# Patient Record
Sex: MALE
[De-identification: names, ages, dates, MRNs, and addresses within clinical notes are randomized per-mention and may not be internally consistent; named-entity substitution may affect disease eponyms.]

---

## 2021-01-24 ENCOUNTER — HOSPITAL ENCOUNTER (EMERGENCY)
Dept: HOSPITAL 5 - ED | Age: 61
Discharge: HOME | End: 2021-01-24
Payer: SELF-PAY

## 2021-01-24 VITALS — SYSTOLIC BLOOD PRESSURE: 168 MMHG | DIASTOLIC BLOOD PRESSURE: 107 MMHG

## 2021-01-24 DIAGNOSIS — Z79.899: ICD-10-CM
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
## 2021-01-24 DIAGNOSIS — F17.200: ICD-10-CM
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
## 2021-01-24 DIAGNOSIS — S61.211A: Primary | ICD-10-CM
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
## 2021-01-24 PROCEDURE — 99283 EMERGENCY DEPT VISIT LOW MDM: CPT

## 2021-01-24 PROCEDURE — 90715 TDAP VACCINE 7 YRS/> IM: CPT

## 2021-01-24 PROCEDURE — 90471 IMMUNIZATION ADMIN: CPT

## 2021-01-24 NOTE — EMERGENCY DEPARTMENT REPORT
ED Laceration HPI





- HPI


Chief Complaint: Wound/Laceration


Stated Complaint: LT HAND INJURY


Time Seen by Provider: 01/24/21 14:53


Occurred When: Today


Location: Upper Extremity (Left hand)


Tetanus Status: Not up to Date


Laceration Symptoms: Yes Pain, No Foreign Body Sensation, No Numbness, No 

Weakness


Other History: 60-year-old  male presents to the emergency room for 

left index finger laceration that happened this afternoon.  Patient states that 

he was using a saw to cut wood for furniture and cut himself.  Patient reports 

he has not had a tetanus shot in probably 30 years.  Patient complains of pain. 

Patient has no other injuries.  Patient reports no past medical history as he 

really does not follow-up with a primary care provider.





ED Review of Systems


ROS: 


Stated complaint: LT HAND INJURY


Other details as noted in HPI





Comment: All other systems reviewed and negative





ED Past Medical Hx





- Past Medical History


Previous Medical History?: No





- Surgical History


Past Surgical History?: No





- Social History


Smoking Status: Current Every Day Smoker


Substance Use Type: None





- Medications


Home Medications: 


                                Home Medications











 Medication  Instructions  Recorded  Confirmed  Last Taken  Type


 


Acetaminophen/Codeine [Tylenol 1 tab PO Q6H PRN #12 tab 01/24/21  Unknown Rx





/Codeine # 3 tab]     


 


Clindamycin [Clindamycin CAP] 300 mg PO Q8H 10 Days #30 cap 01/24/21  Unknown Rx


 


amLODIPine 5 mg PO DAILY #30 tab 01/24/21  Unknown Rx














Laceration Physical Exam





- Exam


General: 


Vital signs noted. No distress. Alert and acting appropriately.








ED Course


                                   Vital Signs











  01/24/21





  13:49


 


Temperature 97.7 F


 


Pulse Rate 87


 


Respiratory 18





Rate 


 


Blood Pressure 179/122


 


O2 Sat by Pulse 95





Oximetry 














- Laceration /Wound Repair


  ** Left Finger


Wound Location: upper extremity (Left lower medial index finger)


Wound Length (cm): 4


Wound Explored: no foreign body removed


Irrigated w/ Saline (ccs): 60


Betadine Prep?: Yes


Anesthesia: 1% Lidocaine


Volume Anesthetic (ccs): 10


Wound Debrided: minimal


Wound Repaired With: sutures


Suture Size/Type: 3:0


Number of Sutures: 7


Layer Closure?: No


Sterile Dressing Applied?: Yes


Progress: 





Patient tolerated well





ED Medical Decision Making





- Medical Decision Making





60-year-old  male presents to the emergency room for left index finger 

laceration that happened this afternoon.  Patient states that he was using a saw

to cut wood for furniture and cut himself.  Patient reports he has not had a 

tetanus shot in probably 30 years.  Patient complains of pain.  Patient has no 

other injuries.  Patient reports no past medical history as he really does not 

follow-up with a primary care provider.





Laceration repair 2 left index finger.  Instructed patient to complete 

antibiotics.  Instructed patient to start hypertension medicine and encouraged 

him to follow-up with a community provider.  [facility] is to return back to the emergency room to have 

sutures removed in 10 to 14 days.


Critical care attestation.: 


If time is entered above; I have spent that time in minutes in the direct care 

of this critically ill patient, excluding procedure time.








ED Disposition


Clinical Impression: 


 Laceration of left hand, HTN (hypertension)





Disposition: DC-01 TO HOME OR SELFCARE


Is pt being admited?: No


Does the pt Need Aspirin: No


Condition: Stable


Instructions:  Hypertension, Adult, Easy-to-Read, Sutured Wound Care, 

Easy-to-Read, Hypertension (ED)


Additional Instructions: 


Complete antibiotics as prescribed.  Pain medication as needed.  Do not operate 

heavy machinery while taking pain medication.  You can also incorporate 

ibuprofen for your pain management.  You must return back in 10 to 14 days to 

have sutures removed.


Prescriptions: 


amLODIPine 5 mg PO DAILY #30 tab


Clindamycin [Clindamycin CAP] 300 mg PO Q8H 10 Days #30 cap


Acetaminophen/Codeine [Tylenol /Codeine # 3 tab] 1 tab PO Q6H PRN #12 tab


 PRN Reason: Pain , Severe (7-10)


Referrals: 


PRIMARY CARE,MD [Primary Care Provider] - 3-5 Days


[facility] [Provider Group] - 3-5 Days

## 2021-01-24 NOTE — XRAY REPORT
LEFT HAND 3 VIEWS



INDICATION / CLINICAL INFORMATION:

lac base of index finger;skill saw.



COMPARISON:

None available.



FINDINGS:

Metallic foreign bodies are demonstrated in the dorsum of the hand at the level of the third and four
th metacarpal phalangeal joints.



No appreciable fracture or other skeletal abnormality.



Signer Name: Marco Andres MD 

Signed: 1/24/2021 2:29 PM

Workstation Name: [id number]

## 2024-06-12 ENCOUNTER — TELEPHONE ENCOUNTER (OUTPATIENT)
Dept: URBAN - METROPOLITAN AREA CLINIC 92 | Facility: CLINIC | Age: 64
End: 2024-06-12

## 2024-06-14 ENCOUNTER — OFFICE VISIT (OUTPATIENT)
Dept: URBAN - METROPOLITAN AREA CLINIC 92 | Facility: CLINIC | Age: 64
End: 2024-06-14

## 2024-08-02 PROBLEM — 453861000124107: Status: ACTIVE | Noted: 2024-08-02

## 2024-08-02 PROBLEM — 443913008: Status: ACTIVE | Noted: 2024-08-02

## 2024-08-05 ENCOUNTER — OFFICE VISIT (OUTPATIENT)
Dept: URBAN - METROPOLITAN AREA CLINIC 86 | Facility: CLINIC | Age: 64
End: 2024-08-05

## 2024-08-05 NOTE — HPI-TODAY'S VISIT:
This is a 63-year-old male, referred by Rebekah Osborn nurse practitioner for evaluation of hepatitis C positive antibody test. The patient is listed as taking aspirin 81 mg, atorvastatin 80 mg, gabapentin 400 mg, losartan hydrochlorothiazide 5012 0.5, metoprolol 25 mg, omeprazole 40 mg, and tramadol 50 as needed.   Labs on April 24 showed total cholesterol 113.  HIV test is negative.  RPR negative.  Hepatitis C antibody was reactive.  White blood cells 8.7, MCV 82 and platelets listed as 214.  Glucose 103, creatinine 0.89, sodium 144, potassium 4.4, bilirubin 0.4, alkaline phosphatase 94, AST 26 and ALT 30.  Hemoglobin A1c 6.4.

## 2024-08-09 ENCOUNTER — OFFICE VISIT (OUTPATIENT)
Dept: URBAN - METROPOLITAN AREA CLINIC 86 | Facility: CLINIC | Age: 64
End: 2024-08-09
Payer: COMMERCIAL

## 2024-08-09 ENCOUNTER — DASHBOARD ENCOUNTERS (OUTPATIENT)
Age: 64
End: 2024-08-09

## 2024-08-09 ENCOUNTER — LAB OUTSIDE AN ENCOUNTER (OUTPATIENT)
Dept: URBAN - METROPOLITAN AREA CLINIC 86 | Facility: CLINIC | Age: 64
End: 2024-08-09

## 2024-08-09 VITALS
HEART RATE: 77 BPM | SYSTOLIC BLOOD PRESSURE: 138 MMHG | HEIGHT: 67 IN | BODY MASS INDEX: 28.41 KG/M2 | TEMPERATURE: 98.1 F | WEIGHT: 181 LBS | DIASTOLIC BLOOD PRESSURE: 88 MMHG

## 2024-08-09 DIAGNOSIS — R76.8 HCV ANTIBODY POSITIVE: ICD-10-CM

## 2024-08-09 DIAGNOSIS — Z79.899 HIGH RISK MEDICATION USE: ICD-10-CM

## 2024-08-09 PROBLEM — 314706002: Status: ACTIVE | Noted: 2024-08-09

## 2024-08-09 PROCEDURE — 99204 OFFICE O/P NEW MOD 45 MIN: CPT | Performed by: PHYSICIAN ASSISTANT

## 2024-08-09 RX ORDER — DICLOFENAC POTASSIUM 50 MG/1
1 TABLET WITH FOOD OR MILK AS NEEDED TABLET, FILM COATED ORAL TWICE A DAY
Status: ACTIVE | COMMUNITY

## 2024-08-09 RX ORDER — ATORVASTATIN CALCIUM 40 MG/1
1 TABLET TABLET, FILM COATED ORAL ONCE A DAY
Status: ACTIVE | COMMUNITY

## 2024-08-09 RX ORDER — TRAMADOL HYDROCHLORIDE 50 MG/1
1 TABLET AS NEEDED TABLET, FILM COATED ORAL ONCE A DAY
Status: ACTIVE | COMMUNITY

## 2024-08-09 RX ORDER — LOSARTAN POTASSIUM 50 MG/1
1 TABLET TABLET, FILM COATED ORAL ONCE A DAY
Status: ACTIVE | COMMUNITY

## 2024-08-09 RX ORDER — ALBUTEROL SULFATE 90 UG/1
1 PUFF AS NEEDED AEROSOL, METERED RESPIRATORY (INHALATION)
Status: ACTIVE | COMMUNITY

## 2024-08-09 NOTE — HPI-TODAY'S VISIT:
This is a 63-year-old male, referred by Rebekah Osborn nurse practitioner for evaluation of hepatitis C positive antibody test.  The patient is listed as taking aspirin 81 mg, atorvastatin 80 mg, gabapentin 400 mg, losartan hydrochlorothiazide  50-12.5, metoprolol 25 mg, omeprazole 40 mg, and tramadol 50 as needed.    Labs on April 24 showed total cholesterol 113.  HIV test is negative.  RPR negative.  Hepatitis C antibody was reactive.  White blood cells 8.7, MCV 82 and platelets listed as 214.  Glucose 103, creatinine 0.89, sodium 144, potassium 4.4, bilirubin 0.4, alkaline phosphatase 94, AST 26 and ALT 30.  Hemoglobin A1c 6.4.  He notes he was told his about 30 years ago.   Discussed the virus and transmssion and hx of iv drug use Disucssed the us and why needed and discussed med options.  He is on atorvstatin and needs to be on pravastatin Will update labs and check the genotype and check for the hep b core status.

## 2024-08-22 ENCOUNTER — OFFICE VISIT (OUTPATIENT)
Dept: URBAN - METROPOLITAN AREA CLINIC 91 | Facility: CLINIC | Age: 64
End: 2024-08-22
Payer: COMMERCIAL

## 2024-08-22 DIAGNOSIS — K76.0 FATTY (CHANGE OF) LIVER: ICD-10-CM

## 2024-08-22 DIAGNOSIS — B18.2 HEP C W/O COMA, CHRONIC: ICD-10-CM

## 2024-08-22 PROCEDURE — 76705 ECHO EXAM OF ABDOMEN: CPT

## 2024-08-22 PROCEDURE — 93975 VASCULAR STUDY: CPT

## 2024-08-25 LAB
A/G RATIO: 1.8
ABSOLUTE BASOPHILS: 30
ABSOLUTE EOSINOPHILS: 30
ABSOLUTE LYMPHOCYTES: 2873
ABSOLUTE MONOCYTES: 654
ABSOLUTE NEUTROPHILS: 4013
ALBUMIN: 4.2
ALKALINE PHOSPHATASE: 78
ALT (SGPT): 20
AST (SGOT): 20
BASOPHILS: 0.4
BILIRUBIN, TOTAL: 0.4
BUN/CREATININE RATIO: (no result)
BUN: 14
CALCIUM: 9.4
CARBON DIOXIDE, TOTAL: 31
CHLORIDE: 100
CREATININE: 0.73
EGFR: 102
EOSINOPHILS: 0.4
GLOBULIN, TOTAL: 2.4
GLUCOSE: 98
HCV RNA, QUANTITATIVE REAL TIME PCR: <1.18
HCV RNA, QUANTITATIVE REAL TIME PCR: <15
HEMATOCRIT: 42.2
HEMOGLOBIN: 13.6
HEPATITIS A AB, TOTAL: (no result)
HEPATITIS B CORE AB TOTAL: (no result)
HEPATITIS B SURFACE AB IMMUNITY, QN: <5
HEPATITIS B SURFACE ANTIGEN: (no result)
LYMPHOCYTES: 37.8
MCH: 27.9
MCHC: 32.2
MCV: 86.7
MONOCYTES: 8.6
MPV: 10.8
NEUTROPHILS: 52.8
PLATELET COUNT: 224
POTASSIUM: 4
PROTEIN, TOTAL: 6.6
RDW: 13.1
RED BLOOD CELL COUNT: 4.87
SODIUM: 141
WHITE BLOOD CELL COUNT: 7.6

## 2024-08-26 ENCOUNTER — TELEPHONE ENCOUNTER (OUTPATIENT)
Dept: URBAN - METROPOLITAN AREA CLINIC 86 | Facility: CLINIC | Age: 64
End: 2024-08-26

## 2024-08-26 NOTE — HPI-TODAY'S VISIT:
Dear Rad Kwon,  The 8/22/24 labs were sent to me. You are not immune to hepatitis b and recommend discussing this vaccine with your primary care.  The creatinine 0.73, sodium 141, potassium 4.0, bilirubin 0.4, alkaline phosphatase 78, AST 20 and ALT 20.  The white blood cell 7.6, hemoglobin 13.6, MCV 86 and platelets 224 and this is normal.  There is no evidence of hepatitis B.  You are not immune to hepatitis A and recommend discussing it with your primary care.  Hepatitis C testing was negative.  The ultrasound was also sent to me and it showed that the liver had increased echogenicity but did not see any lesions.  Common bile duct 5 mm and this was normal.  The gallbladder appeared normal.  Right kidney normal.  Hepatic vasculature patent.  Spleen 8.4 cm.  Overall they saw increased echogenicity of the liver which can be seen seen and fatty liver and chronic liver disease.  If you recall we were checking this because you had a lab with your primary care indicating that you had the antibody to hepatitis C.  This testing shows that there is no virus.  We will review this at the follow-up visit. Breana Estrella PA-C

## 2024-09-27 ENCOUNTER — OFFICE VISIT (OUTPATIENT)
Dept: URBAN - METROPOLITAN AREA CLINIC 86 | Facility: CLINIC | Age: 64
End: 2024-09-27

## 2024-10-07 ENCOUNTER — OFFICE VISIT (OUTPATIENT)
Dept: URBAN - METROPOLITAN AREA CLINIC 86 | Facility: CLINIC | Age: 64
End: 2024-10-07
Payer: MEDICARE

## 2024-10-07 VITALS
DIASTOLIC BLOOD PRESSURE: 84 MMHG | SYSTOLIC BLOOD PRESSURE: 147 MMHG | HEIGHT: 67 IN | HEART RATE: 60 BPM | BODY MASS INDEX: 29.35 KG/M2 | TEMPERATURE: 97.7 F | WEIGHT: 187 LBS

## 2024-10-07 DIAGNOSIS — Z79.899 HIGH RISK MEDICATION USE: ICD-10-CM

## 2024-10-07 DIAGNOSIS — K76.0 FATTY LIVER: ICD-10-CM

## 2024-10-07 DIAGNOSIS — Z71.85 VACCINE COUNSELING: ICD-10-CM

## 2024-10-07 DIAGNOSIS — R76.8 HCV ANTIBODY POSITIVE: ICD-10-CM

## 2024-10-07 PROBLEM — 197321007: Status: ACTIVE | Noted: 2024-10-07

## 2024-10-07 PROCEDURE — 99214 OFFICE O/P EST MOD 30 MIN: CPT | Performed by: PHYSICIAN ASSISTANT

## 2024-10-07 RX ORDER — DICLOFENAC POTASSIUM 50 MG/1
1 TABLET WITH FOOD OR MILK AS NEEDED TABLET, FILM COATED ORAL TWICE A DAY
Status: ACTIVE | COMMUNITY

## 2024-10-07 RX ORDER — TRAMADOL HYDROCHLORIDE 50 MG/1
1 TABLET AS NEEDED TABLET, FILM COATED ORAL ONCE A DAY
Status: ACTIVE | COMMUNITY

## 2024-10-07 RX ORDER — ATORVASTATIN CALCIUM 40 MG/1
1 TABLET TABLET, FILM COATED ORAL ONCE A DAY
Status: ACTIVE | COMMUNITY

## 2024-10-07 RX ORDER — ALBUTEROL SULFATE 90 UG/1
1 PUFF AS NEEDED AEROSOL, METERED RESPIRATORY (INHALATION)
Status: ACTIVE | COMMUNITY

## 2024-10-07 RX ORDER — LOSARTAN POTASSIUM 50 MG/1
1 TABLET TABLET, FILM COATED ORAL ONCE A DAY
Status: ACTIVE | COMMUNITY

## 2024-10-07 NOTE — HPI-TODAY'S VISIT:
This is a 63-year-old male, referred by Rebekah Osborn nurse practitioner for evaluation of hepatitis C positive antibody test.  10/7/24 Dear Rad Kwon,  The 8/22/24 labs were sent to me. You are not immune to hepatitis b and recommend discussing this vaccine with your primary care. The creatinine 0.73, sodium 141, potassium 4.0, bilirubin 0.4, alkaline phosphatase 78, AST 20 and ALT 20. The white blood cell 7.6, hemoglobin 13.6, MCV 86 and platelets 224 and this is normal. There is no evidence of hepatitis B. You are not immune to hepatitis A and recommend discussing it with your primary care. Hepatitis C testing was negative. The ultrasound was also sent to me and it showed that the liver had increased echogenicity but did not see any lesions. Common bile duct 5 mm and this was normal. The gallbladder appeared normal. Right kidney normal. Hepatic vasculature patent. Spleen 8.4 cm. Overall they saw increased echogenicity of the liver which can be seen seen and fatty liver and chronic liver disease. If you recall we were checking this because you had a lab with your primary care indicating that you had the antibody to hepatitis C. This testing shows that there is no virus. We will review this at the follow-up visit.  Breana Estrella PA-C  The patient is listed as taking aspirin 81 mg, atorvastatin 80 mg, gabapentin 400 mg, losartan hydrochlorothiazide  50-12.5, metoprolol 25 mg, omeprazole 40 mg, and tramadol 50 as needed.    Labs on April 24 showed total cholesterol 113.  HIV test is negative.  RPR negative.  Hepatitis C antibody was reactive.  White blood cells 8.7, MCV 82 and platelets listed as 214.  Glucose 103, creatinine 0.89, sodium 144, potassium 4.4, bilirubin 0.4, alkaline phosphatase 94, AST 26 and ALT 30.  Hemoglobin A1c 6.4.  He notes he was told his about 30 years ago.   Discussed the virus and transmssion and hx of iv drug use Disucssed the us and why needed and discussed med options.  He is on atorvstatin and needs to be on pravastatin Will update labs and check the genotype and check for the hep b core status.

## 2024-11-15 ENCOUNTER — OFFICE VISIT (OUTPATIENT)
Dept: URBAN - METROPOLITAN AREA CLINIC 118 | Facility: CLINIC | Age: 64
End: 2024-11-15
Payer: MEDICARE

## 2024-11-15 ENCOUNTER — LAB OUTSIDE AN ENCOUNTER (OUTPATIENT)
Dept: URBAN - METROPOLITAN AREA CLINIC 118 | Facility: CLINIC | Age: 64
End: 2024-11-15

## 2024-11-15 VITALS
DIASTOLIC BLOOD PRESSURE: 73 MMHG | HEIGHT: 67 IN | TEMPERATURE: 98.4 F | SYSTOLIC BLOOD PRESSURE: 130 MMHG | HEART RATE: 79 BPM | WEIGHT: 184.2 LBS | BODY MASS INDEX: 28.91 KG/M2

## 2024-11-15 DIAGNOSIS — Z12.11 SCREENING FOR COLORECTAL CANCER: ICD-10-CM

## 2024-11-15 DIAGNOSIS — Z87.09 HISTORY OF COPD: ICD-10-CM

## 2024-11-15 PROBLEM — 270473001: Status: ACTIVE | Noted: 2024-11-15

## 2024-11-15 PROCEDURE — 99203 OFFICE O/P NEW LOW 30 MIN: CPT | Performed by: INTERNAL MEDICINE

## 2024-11-15 RX ORDER — LOSARTAN POTASSIUM 50 MG/1
1 TABLET TABLET, FILM COATED ORAL ONCE A DAY
Status: ACTIVE | COMMUNITY

## 2024-11-15 RX ORDER — DICLOFENAC POTASSIUM 50 MG/1
1 TABLET WITH FOOD OR MILK AS NEEDED TABLET, FILM COATED ORAL TWICE A DAY
Status: ACTIVE | COMMUNITY

## 2024-11-15 RX ORDER — ALBUTEROL SULFATE 90 UG/1
1 PUFF AS NEEDED AEROSOL, METERED RESPIRATORY (INHALATION)
Status: ACTIVE | COMMUNITY

## 2024-11-15 RX ORDER — ATORVASTATIN CALCIUM 40 MG/1
1 TABLET TABLET, FILM COATED ORAL ONCE A DAY
Status: ACTIVE | COMMUNITY

## 2024-11-15 RX ORDER — TRAMADOL HYDROCHLORIDE 50 MG/1
1 TABLET AS NEEDED TABLET, FILM COATED ORAL ONCE A DAY
Status: ACTIVE | COMMUNITY

## 2024-11-15 NOTE — HPI-TODAY'S VISIT:
The patient presents for colonoscopy evaluation.  Denies prior colonoscopy.  Denies gi complaints at this time including abd pain, gi bleeding, change in bowel habits or weight loss.  denies h/o cad/chf.  not on plavix/AC.  h/o COPD, not on home oxygen.

## 2024-12-03 ENCOUNTER — TELEPHONE ENCOUNTER (OUTPATIENT)
Dept: URBAN - METROPOLITAN AREA CLINIC 6 | Facility: CLINIC | Age: 64
End: 2024-12-03

## 2024-12-10 ENCOUNTER — CLAIMS CREATED FROM THE CLAIM WINDOW (OUTPATIENT)
Dept: URBAN - METROPOLITAN AREA SURGERY CENTER 23 | Facility: SURGERY CENTER | Age: 64
End: 2024-12-10
Payer: MEDICARE

## 2024-12-10 ENCOUNTER — CLAIMS CREATED FROM THE CLAIM WINDOW (OUTPATIENT)
Dept: URBAN - METROPOLITAN AREA CLINIC 4 | Facility: CLINIC | Age: 64
End: 2024-12-10
Payer: MEDICARE

## 2024-12-10 DIAGNOSIS — Z12.11 COLON CANCER SCREENING: ICD-10-CM

## 2024-12-10 DIAGNOSIS — K51.40 INFLAMMATORY POLYPS OF COLON WITHOUT COMPLICATIONS: ICD-10-CM

## 2024-12-10 DIAGNOSIS — K64.8 OTHER HEMORRHOIDS: ICD-10-CM

## 2024-12-10 DIAGNOSIS — K51.40 PSEUDOPOLYP OF SIGMOID COLON WITHOUT COMPLICATION: ICD-10-CM

## 2024-12-10 DIAGNOSIS — K63.5 BENIGN COLON POLYP: ICD-10-CM

## 2024-12-10 DIAGNOSIS — K57.30 DIVERTICULOSIS OF COLON: ICD-10-CM

## 2024-12-10 PROCEDURE — 00811 ANES LWR INTST NDSC NOS: CPT | Performed by: NURSE ANESTHETIST, CERTIFIED REGISTERED

## 2024-12-10 PROCEDURE — 45385 COLONOSCOPY W/LESION REMOVAL: CPT | Performed by: INTERNAL MEDICINE

## 2024-12-10 PROCEDURE — 88305 TISSUE EXAM BY PATHOLOGIST: CPT | Performed by: PATHOLOGY

## 2024-12-10 RX ORDER — ALBUTEROL SULFATE 90 UG/1
1 PUFF AS NEEDED AEROSOL, METERED RESPIRATORY (INHALATION)
Status: ACTIVE | COMMUNITY

## 2024-12-10 RX ORDER — LOSARTAN POTASSIUM 50 MG/1
1 TABLET TABLET, FILM COATED ORAL ONCE A DAY
Status: ACTIVE | COMMUNITY

## 2024-12-10 RX ORDER — DICLOFENAC POTASSIUM 50 MG/1
1 TABLET WITH FOOD OR MILK AS NEEDED TABLET, FILM COATED ORAL TWICE A DAY
Status: ACTIVE | COMMUNITY

## 2024-12-10 RX ORDER — ATORVASTATIN CALCIUM 40 MG/1
1 TABLET TABLET, FILM COATED ORAL ONCE A DAY
Status: ACTIVE | COMMUNITY

## 2024-12-10 RX ORDER — TRAMADOL HYDROCHLORIDE 50 MG/1
1 TABLET AS NEEDED TABLET, FILM COATED ORAL ONCE A DAY
Status: ACTIVE | COMMUNITY

## 2025-01-07 ENCOUNTER — LAB OUTSIDE AN ENCOUNTER (OUTPATIENT)
Dept: URBAN - METROPOLITAN AREA CLINIC 86 | Facility: CLINIC | Age: 65
End: 2025-01-07

## 2025-01-07 ENCOUNTER — OFFICE VISIT (OUTPATIENT)
Dept: URBAN - METROPOLITAN AREA CLINIC 91 | Facility: CLINIC | Age: 65
End: 2025-01-07
Payer: MEDICARE

## 2025-01-07 ENCOUNTER — OFFICE VISIT (OUTPATIENT)
Dept: URBAN - METROPOLITAN AREA CLINIC 86 | Facility: CLINIC | Age: 65
End: 2025-01-07
Payer: MEDICARE

## 2025-01-07 VITALS
DIASTOLIC BLOOD PRESSURE: 78 MMHG | SYSTOLIC BLOOD PRESSURE: 128 MMHG | RESPIRATION RATE: 97 BRPM | TEMPERATURE: 97.7 F | WEIGHT: 175 LBS | HEART RATE: 78 BPM | HEIGHT: 67 IN | BODY MASS INDEX: 27.47 KG/M2

## 2025-01-07 DIAGNOSIS — Z79.899 HIGH RISK MEDICATION USE: ICD-10-CM

## 2025-01-07 DIAGNOSIS — R76.8 HCV ANTIBODY POSITIVE: ICD-10-CM

## 2025-01-07 DIAGNOSIS — K76.0 FATTY LIVER: ICD-10-CM

## 2025-01-07 DIAGNOSIS — K76.0 FATTY (CHANGE OF) LIVER: ICD-10-CM

## 2025-01-07 DIAGNOSIS — Z71.85 VACCINE COUNSELING: ICD-10-CM

## 2025-01-07 PROCEDURE — 76705 ECHO EXAM OF ABDOMEN: CPT

## 2025-01-07 PROCEDURE — 99214 OFFICE O/P EST MOD 30 MIN: CPT | Performed by: PHYSICIAN ASSISTANT

## 2025-01-07 PROCEDURE — 93975 VASCULAR STUDY: CPT

## 2025-01-07 RX ORDER — LOSARTAN POTASSIUM 50 MG/1
1 TABLET TABLET, FILM COATED ORAL ONCE A DAY
Status: ACTIVE | COMMUNITY

## 2025-01-07 RX ORDER — ALBUTEROL SULFATE 90 UG/1
1 PUFF AS NEEDED AEROSOL, METERED RESPIRATORY (INHALATION)
Status: ACTIVE | COMMUNITY

## 2025-01-07 RX ORDER — ATORVASTATIN CALCIUM 40 MG/1
1 TABLET TABLET, FILM COATED ORAL ONCE A DAY
Status: ACTIVE | COMMUNITY

## 2025-01-07 RX ORDER — TRAMADOL HYDROCHLORIDE 50 MG/1
1 TABLET AS NEEDED TABLET, FILM COATED ORAL ONCE A DAY
Status: ACTIVE | COMMUNITY

## 2025-01-07 RX ORDER — DICLOFENAC POTASSIUM 50 MG/1
1 TABLET WITH FOOD OR MILK AS NEEDED TABLET, FILM COATED ORAL TWICE A DAY
Status: ACTIVE | COMMUNITY

## 2025-01-07 NOTE — HPI-TODAY'S VISIT:
This is a 63-year-old male, referred by Rebekah Osborn nurse practitioner for evaluation of hepatitis C positive antibody test.   1/7/25 He had the US done today, prelim w/ fatty liver.  again dw patient hcv negative but given hx and fatty liver need to monitor.  he has lost weight since the last visi t has appt w/ pcp on 1/23   10/7/24 Dear Rad Kwon,  The 8/22/24 labs were sent to me. You are not immune to hepatitis b and recommend discussing this vaccine with your primary care. The creatinine 0.73, sodium 141, potassium 4.0, bilirubin 0.4, alkaline phosphatase 78, AST 20 and ALT 20. The white blood cell 7.6, hemoglobin 13.6, MCV 86 and platelets 224 and this is normal. There is no evidence of hepatitis B. You are not immune to hepatitis A and recommend discussing it with your primary care. Hepatitis C testing was negative. The ultrasound was also sent to me and it showed that the liver had increased echogenicity but did not see any lesions. Common bile duct 5 mm and this was normal. The gallbladder appeared normal. Right kidney normal. Hepatic vasculature patent. Spleen 8.4 cm. Overall they saw increased echogenicity of the liver which can be seen seen and fatty liver and chronic liver disease. If you recall we were checking this because you had a lab with your primary care indicating that you had the antibody to hepatitis C. This testing shows that there is no virus. We will review this at the follow-up visit.  Breana Estrella PA-C  The patient is listed as taking aspirin 81 mg, atorvastatin 80 mg, gabapentin 400 mg, losartan hydrochlorothiazide  50-12.5, metoprolol 25 mg, omeprazole 40 mg, and tramadol 50 as needed.    Labs on April 24 showed total cholesterol 113.  HIV test is negative.  RPR negative.  Hepatitis C antibody was reactive.  White blood cells 8.7, MCV 82 and platelets listed as 214.  Glucose 103, creatinine 0.89, sodium 144, potassium 4.4, bilirubin 0.4, alkaline phosphatase 94, AST 26 and ALT 30.  Hemoglobin A1c 6.4.  He notes he was told his about 30 years ago.

## 2025-01-09 ENCOUNTER — TELEPHONE ENCOUNTER (OUTPATIENT)
Dept: URBAN - METROPOLITAN AREA CLINIC 86 | Facility: CLINIC | Age: 65
End: 2025-01-09

## 2025-01-09 NOTE — HPI-TODAY'S VISIT:
Dear Rad Kwon,  Good seeing you the other day.  There is a liver has increased echogenicity but they did not see any lesions.  Gallbladder and common bile duct were normal.  The right and left kidney were normal.  Spleen about 8.6 cm and this is normal.  Everything is getting good blood flow.  Overall they saw fatty liver.  Need to be sure to work on this as discussed.  Please share this with your primary care. Breana Estrella PA-C

## 2025-01-13 ENCOUNTER — LAB OUTSIDE AN ENCOUNTER (OUTPATIENT)
Dept: URBAN - METROPOLITAN AREA CLINIC 86 | Facility: CLINIC | Age: 65
End: 2025-01-13

## 2025-01-14 LAB
ALBUMIN/GLOBULIN RATIO: 2.3
ALBUMIN: 4.8
ALKALINE PHOSPHATASE: 62
ALT: 21
AST: 19
BILIRUBIN, TOTAL: 0.7
BUN/CREATININE RATIO: (no result)
CALCIUM: 10
CARBON DIOXIDE: 29
CHLORIDE: 104
CREATININE: 1.2
EGFR: 67
FIB 4 INDEX: 1.45
FIB 4 INTERPRETATION: (no result)
GLOBULIN: 2.1
GLUCOSE: 103
HCV RNA, QUANTITATIVE REAL TIME PCR: <1.18
HCV RNA, QUANTITATIVE REAL TIME PCR: <15
HEPATITIS C ANTIBODY: REACTIVE
PLATELET COUNT: 186
POTASSIUM: 4.9
PROTEIN, TOTAL: 6.9
SODIUM: 142
UREA NITROGEN (BUN): 22

## 2025-07-01 ENCOUNTER — LAB OUTSIDE AN ENCOUNTER (OUTPATIENT)
Dept: URBAN - METROPOLITAN AREA CLINIC 86 | Facility: CLINIC | Age: 65
End: 2025-07-01

## 2025-07-07 ENCOUNTER — LAB OUTSIDE AN ENCOUNTER (OUTPATIENT)
Dept: URBAN - METROPOLITAN AREA CLINIC 86 | Facility: CLINIC | Age: 65
End: 2025-07-07

## 2025-07-14 ENCOUNTER — OFFICE VISIT (OUTPATIENT)
Dept: URBAN - METROPOLITAN AREA CLINIC 91 | Facility: CLINIC | Age: 65
End: 2025-07-14
Payer: MEDICARE

## 2025-07-14 ENCOUNTER — OFFICE VISIT (OUTPATIENT)
Dept: URBAN - METROPOLITAN AREA CLINIC 86 | Facility: CLINIC | Age: 65
End: 2025-07-14

## 2025-07-14 DIAGNOSIS — B18.2 CHRONIC HEPATITIS C: ICD-10-CM

## 2025-07-14 DIAGNOSIS — K76.0 FATTY LIVER: ICD-10-CM

## 2025-07-14 PROCEDURE — 76705 ECHO EXAM OF ABDOMEN: CPT

## 2025-07-14 PROCEDURE — 93975 VASCULAR STUDY: CPT

## 2025-07-14 RX ORDER — LOSARTAN POTASSIUM 50 MG/1
1 TABLET TABLET, FILM COATED ORAL ONCE A DAY
Status: ACTIVE | COMMUNITY

## 2025-07-14 RX ORDER — ATORVASTATIN CALCIUM 40 MG/1
1 TABLET TABLET, FILM COATED ORAL ONCE A DAY
Status: ACTIVE | COMMUNITY

## 2025-07-14 RX ORDER — DICLOFENAC POTASSIUM 50 MG/1
1 TABLET WITH FOOD OR MILK AS NEEDED TABLET, FILM COATED ORAL TWICE A DAY
Status: ACTIVE | COMMUNITY

## 2025-07-14 RX ORDER — ALBUTEROL SULFATE 90 UG/1
1 PUFF AS NEEDED AEROSOL, METERED RESPIRATORY (INHALATION)
Status: ACTIVE | COMMUNITY

## 2025-07-14 RX ORDER — TRAMADOL HYDROCHLORIDE 50 MG/1
1 TABLET AS NEEDED TABLET, FILM COATED ORAL ONCE A DAY
Status: ACTIVE | COMMUNITY

## 2025-07-22 ENCOUNTER — TELEPHONE ENCOUNTER (OUTPATIENT)
Dept: URBAN - METROPOLITAN AREA CLINIC 86 | Facility: CLINIC | Age: 65
End: 2025-07-22

## 2025-07-22 NOTE — HPI-TODAY'S VISIT:
Rad Kwon,  The recent ultrasound was sent to me.  The liver was mildly increased in echogenicity but the contour was normal.  Common bile duct normal.  Gallbladder normal.  Right kidney appeared normal.  Hepatic vascular is patent and spleen 8.9 cm.  Still need to continue to monitor this.  If we compare this to last check it stable.  We were supposed to have some blood work and I do not see a copy of this.  I will have the office reach out to you and get this set up and we can review.  Breana Estrella PA-C
